# Patient Record
(demographics unavailable — no encounter records)

---

## 2018-05-06 NOTE — EDM.PDOC
ED HPI GENERAL MEDICAL PROBLEM





- General


Chief Complaint: Lower Extremity Injury/Pain


Stated Complaint: KNEE PAIN [id number]


Time Seen by Provider: 05/06/18 01:03


Source of Information: Reports: Patient


History Limitations: Reports: No Limitations





- History of Present Illness


INITIAL COMMENTS - FREE TEXT/NARRATIVE: 





states twisted left knee 3 days ago then tonight it gave out and fell onto it.


Treatments PTA: Reports: NSAIDS





- Related Data


 Allergies











Allergy/AdvReac Type Severity Reaction Status Date / Time


 


No Known Allergies Allergy   Verified 10/23/16 10:54











Home Meds: 


 Home Meds





. [No Known Home Meds]  05/06/18 [History]











Past Medical History





- Past Health History


Medical/Surgical History: Denies Medical/Surgical History


Respiratory History: Reports: Asthma





- Past Surgical History


Head Surgeries/Procedures: Reports: Other (See Below)





Social & Family History





- Family History


Family Medical History: Noncontributory





- Tobacco Use


Smoking Status *Q: Unknown Ever Smoked


Second Hand Smoke Exposure: No





- Caffeine Use


Caffeine Use: Reports: Coffee





- Recreational Drug Use


Recreational Drug Use: No





Review of Systems





- Review of Systems


Review Of Systems: ROS reveals no pertinent complaints other than HPI.





ED EXAM, GENERAL





- Physical Exam


Exam: See Below


Exam Limited By: No Limitations


General Appearance: Alert, WD/WN, No Apparent Distress


Ears: Hearing Grossly Normal


Throat/Mouth: Normal Voice, No Airway Compromise


Head: Atraumatic


Neck: Non-Tender, Full Range of Motion


Respiratory/Chest: No Respiratory Distress


Cardiovascular: Regular Rate, Rhythm


GI/Abdominal: Soft, Non-Tender


Extremities: Other (left knee no gross D/D, tneder on R/P, NV wnl, gait limited 

to pain)


Neurological: Alert, Oriented, Normal Cognition, No Motor/Sensory Deficits


Psychiatric: Normal Affect, Normal Mood


Skin Exam: Warm, Dry, Normal Color


Lymphatic: No Adenopathy





Course





- Vital Signs


Last Recorded V/S: 





 Last Vital Signs











Temp  36.8 C   05/06/18 00:31


 


Pulse  70   05/06/18 00:31


 


Resp  20 H  05/06/18 00:31


 


BP  114/63   05/06/18 00:31


 


Pulse Ox  100   05/06/18 00:31














- Re-Assessments/Exams


Free Text/Narrative Re-Assessment/Exam: 





05/06/18 01:05


results discussed with pt & father.





Departure





- Departure


Time of Disposition: 01:09


Disposition: Home, Self-Care 01


Condition: Good


Clinical Impression: 


Contusion of knee, left


Qualifiers:


 Encounter type: initial encounter Qualified Code(s): S80.02XA - Contusion of 

left knee, initial encounter








- Discharge Information


Instructions:  Contusion


Forms:  ED Department Discharge


Additional Instructions: 


1) wear ACE for comfort


2) elevate leg as much as possible next 48 hours


3) take tylenol or motrin for pain


4) follow up at clinic for possible MRI if not totally better by Wednesday

## 2019-04-28 NOTE — EDM.PDOC
ED HPI GENERAL MEDICAL PROBLEM





- General


Chief Complaint: Genitourinary Problem


Stated Complaint: A LOT OF PAIN ON HER RIGHT SIDE, KIDNEY INFECTION?


Time Seen by Provider: 04/28/19 20:45


Source of Information: Reports: Patient


History Limitations: Reports: No Limitations





- History of Present Illness


INITIAL COMMENTS - FREE TEXT/NARRATIVE: 





onset upper abd pain this AM only ate oatmeal prior. no N/V/D just pain, had 

this before and told UTI. denies dysuria. only able to eat ramen noodles all 

day without problem


  ** Lower Abdomen


Pain Score (Numeric/FACES): 8





- Related Data


 Allergies











Allergy/AdvReac Type Severity Reaction Status Date / Time


 


No Known Allergies Allergy   Verified 10/23/16 10:54











Home Meds: 


 Home Meds





. [No Known Home Meds]  05/06/18 [History]











Past Medical History





- Past Health History


Medical/Surgical History: Denies Medical/Surgical History


Respiratory History: Reports: Asthma





- Past Surgical History


Head Surgeries/Procedures: Reports: Other (See Below)





Social & Family History





- Family History


Family Medical History: Noncontributory





- Tobacco Use


Smoking Status *Q: Never Smoker


Second Hand Smoke Exposure: No





- Caffeine Use


Caffeine Use: Reports: Coffee, Soda, Tea





- Recreational Drug Use


Recreational Drug Use: No





ED ROS GENERAL





- Review of Systems


Review Of Systems: ROS reveals no pertinent complaints other than HPI.





ED EXAM, GI/ABD





- Physical Exam


Exam: See Below


Exam Limited By: No Limitations


General Appearance: Alert, WD/WN, Mild Distress, Other (discomfort)


Ears: Hearing Grossly Normal


Throat/Mouth: Normal Voice, No Airway Compromise


Head: Atraumatic


Neck: Non-Tender, Full Range of Motion


Respiratory/Chest: No Respiratory Distress


Cardiovascular: Regular Rate, Rhythm


GI/Abdominal Exam: Soft, No Distention, Other (minimal epiG discomfort, BS hyper

).  No: Guarding, Rigid, Rebound


Neurological: Alert, Oriented, Normal Cognition, Normal Gait, No Motor/Sensory 

Deficits


Psychiatric: Flat Affect


Skin Exam: Warm, Dry, Normal Color


Lymphatic: No Adenopathy





Course





- Vital Signs


Last Recorded V/S: 


 Last Vital Signs











Temp  36.0 C   04/28/19 20:27


 


Pulse  98 H  04/28/19 20:27


 


Resp  16   04/28/19 20:27


 


BP  130/63   04/28/19 20:27


 


Pulse Ox  99   04/28/19 20:27














- Orders/Labs/Meds


Orders: 


 Active Orders 24 hr











 Category Date Time Status


 


 Ketorolac [Toradol] Med  04/28/19 21:43 Once





 30 mg IM ONETIME ONE   











Labs: 


 Laboratory Tests











  04/28/19 04/28/19 04/28/19 Range/Units





  20:25 20:25 20:25 


 


WBC     (3.5-11.0)  10^3/uL


 


RBC     (4.1-5.3)  10^6/uL


 


Hgb     (12.0-16.0)  g/dL


 


Hct     (36.0-49.0)  %


 


MCV     ()  fL


 


MCH     (25.0-35)  pg


 


MCHC     (31.0-37.0)  g/dL


 


Plt Count     (150-300)  10^3/uL


 


Neut % (Auto)     (30.0-70.0)  %


 


Lymph % (Auto)     (21.0-51.0)  %


 


Mono % (Auto)     (2-8)  %


 


Eos % (Auto)     (1.0-5.0)  %


 


Baso % (Auto)     (1.0-2.0)  %


 


Sodium     (135-145)  mmol/L


 


Potassium     (3.6-5.0)  mmol/L


 


Chloride     (101-111)  mmol/L


 


Carbon Dioxide     (21.0-31.0)  mmol/L


 


Anion Gap     


 


BUN     (7-18)  mg/dL


 


Creatinine     (0.6-1.3)  mg/dL


 


Est Cr Clr Drug Dosing     


 


Estimated GFR (MDRD)     


 


BUN/Creatinine Ratio     


 


Glucose     ()  mg/dL


 


Calcium     (8.4-10.2)  mg/dl


 


Total Bilirubin     (0.1-1.9)  mg/dL


 


AST     (10-42)  IU/L


 


ALT     (10-60)  IU/L


 


Alkaline Phosphatase     ()  IU/L


 


Total Protein     (6.7-8.2)  g/dl


 


Albumin     (3.1-4.8)  g/dl


 


Globulin     


 


Albumin/Globulin Ratio     


 


Amylase     ()  U/L


 


Lipase     (22-51)  U/L


 


Urine Color  Yellow    (YELLOW)  


 


Urine Appearance  Clear    (CLEAR)  


 


Urine pH  6.0    (5.0-9.0)  


 


Ur Specific Gravity  1.025    (1.005-1.030)  


 


Urine Protein  Negative    (NEGATIVE)  


 


Urine Glucose (UA)  Negative    (NEGATIVE)  


 


Urine Ketones  Negative    (NEGATIVE)  


 


Urine Occult Blood  Negative    (NEGATIVE)  


 


Urine Nitrite  Negative    (NEGATIVE)  


 


Urine Bilirubin  Negative    (NEGATIVE)  


 


Urine Urobilinogen  1.0    (0.2-1.0)  mg/dL


 


Ur Leukocyte Esterase  Negative    (NEGATIVE)  


 


Urine HCG, Qual   Negative   


 


Urine Opiates Screen    Negative  (NEGATIVE)  


 


Ur Oxycodone Screen    Negative  (NEGATIVE)  


 


Urine Methadone Screen    Negative  (NEGATIVE)  


 


Ur Barbiturates Screen    Negative  (NEGATIVE)  


 


U Tricyclic Antidepress    Negative  (NEGATIVE)  


 


Ur Phencyclidine Scrn    Negative  (NEGATIVE)  


 


Ur Amphetamine Screen    Negative  (NEGATIVE)  


 


U Methamphetamines Scrn    Negative  (NEGATIVE)  


 


Urine MDMA Screen    Negative  (NEGATIVE)  


 


U Benzodiazepines Scrn    Negative  (NEGATIVE)  


 


Urine Cocaine Screen    Negative  (NEGATIVE)  


 


U Marijuana (THC) Screen    Negative  (NEGATIVE)  














  04/28/19 04/28/19 Range/Units





  20:46 20:46 


 


WBC  5.3   (3.5-11.0)  10^3/uL


 


RBC  4.96   (4.1-5.3)  10^6/uL


 


Hgb  12.7   (12.0-16.0)  g/dL


 


Hct  39.0   (36.0-49.0)  %


 


MCV  78.6   ()  fL


 


MCH  25.6   (25.0-35)  pg


 


MCHC  32.6   (31.0-37.0)  g/dL


 


Plt Count  331 H   (150-300)  10^3/uL


 


Neut % (Auto)  54.9   (30.0-70.0)  %


 


Lymph % (Auto)  26.4   (21.0-51.0)  %


 


Mono % (Auto)  16.6 H   (2-8)  %


 


Eos % (Auto)  1.7   (1.0-5.0)  %


 


Baso % (Auto)  0.4 L   (1.0-2.0)  %


 


Sodium   135  (135-145)  mmol/L


 


Potassium   3.8  (3.6-5.0)  mmol/L


 


Chloride   102  (101-111)  mmol/L


 


Carbon Dioxide   25.0  (21.0-31.0)  mmol/L


 


Anion Gap   11.8  


 


BUN   11  (7-18)  mg/dL


 


Creatinine   0.7  (0.6-1.3)  mg/dL


 


Est Cr Clr Drug Dosing   TNP  


 


Estimated GFR (MDRD)   99  


 


BUN/Creatinine Ratio   15.71  


 


Glucose   100  ()  mg/dL


 


Calcium   8.6  (8.4-10.2)  mg/dl


 


Total Bilirubin   0.4  (0.1-1.9)  mg/dL


 


AST   24  (10-42)  IU/L


 


ALT   27  (10-60)  IU/L


 


Alkaline Phosphatase   92  ()  IU/L


 


Total Protein   7.5  (6.7-8.2)  g/dl


 


Albumin   3.7  (3.1-4.8)  g/dl


 


Globulin   3.8  


 


Albumin/Globulin Ratio   0.97  


 


Amylase   43  ()  U/L


 


Lipase   23  (22-51)  U/L


 


Urine Color    (YELLOW)  


 


Urine Appearance    (CLEAR)  


 


Urine pH    (5.0-9.0)  


 


Ur Specific Gravity    (1.005-1.030)  


 


Urine Protein    (NEGATIVE)  


 


Urine Glucose (UA)    (NEGATIVE)  


 


Urine Ketones    (NEGATIVE)  


 


Urine Occult Blood    (NEGATIVE)  


 


Urine Nitrite    (NEGATIVE)  


 


Urine Bilirubin    (NEGATIVE)  


 


Urine Urobilinogen    (0.2-1.0)  mg/dL


 


Ur Leukocyte Esterase    (NEGATIVE)  


 


Urine HCG, Qual    


 


Urine Opiates Screen    (NEGATIVE)  


 


Ur Oxycodone Screen    (NEGATIVE)  


 


Urine Methadone Screen    (NEGATIVE)  


 


Ur Barbiturates Screen    (NEGATIVE)  


 


U Tricyclic Antidepress    (NEGATIVE)  


 


Ur Phencyclidine Scrn    (NEGATIVE)  


 


Ur Amphetamine Screen    (NEGATIVE)  


 


U Methamphetamines Scrn    (NEGATIVE)  


 


Urine MDMA Screen    (NEGATIVE)  


 


U Benzodiazepines Scrn    (NEGATIVE)  


 


Urine Cocaine Screen    (NEGATIVE)  


 


U Marijuana (THC) Screen    (NEGATIVE)  














- Re-Assessments/Exams


Free Text/Narrative Re-Assessment/Exam: 





04/28/19 21:43


results discussed with pt & father.





Departure





- Departure


Time of Disposition: 21:44


Disposition: Home, Self-Care 01


Condition: Good


Clinical Impression: 


Abdominal pain


Qualifiers:


 Abdominal location: epigastric Qualified Code(s): R10.13 - Epigastric pain








- Discharge Information


Instructions:  Abdominal Pain, Pediatric


Forms:  ED Department Discharge


Additional Instructions: 


1) avoid solid foods next 48 hours


2) see clinic tomorrow for GALL BLADDER ULTRASOUND








- My Orders


Last 24 Hours: 


My Active Orders





04/28/19 21:43


Ketorolac [Toradol]   30 mg IM ONETIME ONE 














- Assessment/Plan


Last 24 Hours: 


My Active Orders





04/28/19 21:43


Ketorolac [Toradol]   30 mg IM ONETIME ONE